# Patient Record
(demographics unavailable — no encounter records)

---

## 2025-04-07 NOTE — PLAN
[FreeTextEntry1] : annual: pap  s/a, male partner, did take plan  b recently, does get reg cycle but they were a bit delayed off the plan b d/w pt options for birth control, will continue to think about it  states she recently did sti tests with a PMD (included gc/ct) did bloodwork with PMD thinks she got HPV vaccine

## 2025-04-07 NOTE — HISTORY OF PRESENT ILLNESS
[N] : Patient denies prior pregnancies [Normal Amount/Duration] :  normal amount and duration [Menstrual Cramps] : menstrual cramps [Currently Active] : currently active [Men] : men [Regular Cycle Intervals] : periods have been regular [Frequency: Q ___ days] : menstrual periods occur approximately every [unfilled] days [No] : never pregnant [TextBox_6] : 3/11/25 [TextBox_9] : 12 [FreeTextEntry1] : 03/11/2025

## 2025-04-07 NOTE — PHYSICAL EXAM
[MA] : MA [FreeTextEntry2] : Lindsey [Appropriately responsive] : appropriately responsive [Alert] : alert [No Acute Distress] : no acute distress [No Lymphadenopathy] : no lymphadenopathy [Regular Rate Rhythm] : regular rate rhythm [No Murmurs] : no murmurs [Clear to Auscultation B/L] : clear to auscultation bilaterally [Soft] : soft [Non-tender] : non-tender [Non-distended] : non-distended [No Lesions] : no lesions [No Mass] : no mass [Oriented x3] : oriented x3 [Examination Of The Breasts] : a normal appearance [No Masses] : no breast masses were palpable [Labia Majora] : normal [Labia Minora] : normal [Normal] : normal [Uterine Adnexae] : normal